# Patient Record
Sex: MALE | Race: WHITE | Employment: STUDENT | URBAN - METROPOLITAN AREA
[De-identification: names, ages, dates, MRNs, and addresses within clinical notes are randomized per-mention and may not be internally consistent; named-entity substitution may affect disease eponyms.]

---

## 2017-12-22 ENCOUNTER — OFFICE VISIT (OUTPATIENT)
Dept: FAMILY MEDICINE CLINIC | Facility: CLINIC | Age: 27
End: 2017-12-22

## 2017-12-22 VITALS
HEART RATE: 70 BPM | HEIGHT: 73 IN | TEMPERATURE: 98 F | DIASTOLIC BLOOD PRESSURE: 60 MMHG | OXYGEN SATURATION: 99 % | SYSTOLIC BLOOD PRESSURE: 112 MMHG | WEIGHT: 190 LBS | BODY MASS INDEX: 25.18 KG/M2

## 2017-12-22 DIAGNOSIS — L72.3 SEBACEOUS CYST: Primary | ICD-10-CM

## 2017-12-22 DIAGNOSIS — R22.9 MULTIPLE SKIN NODULES: ICD-10-CM

## 2017-12-22 PROCEDURE — 99212 OFFICE O/P EST SF 10 MIN: CPT | Performed by: FAMILY MEDICINE

## 2017-12-23 PROBLEM — R22.9 MULTIPLE SKIN NODULES: Status: ACTIVE | Noted: 2017-12-23

## 2017-12-23 PROBLEM — L72.3 SEBACEOUS CYST: Status: ACTIVE | Noted: 2017-12-23

## 2017-12-23 NOTE — PROGRESS NOTES
HPI:  Patient presents with:  Lump: in arms, abdomen and head for 6 years no pain or itchy       Fannie Buchanan is a 28year old male who primarily presents for Dermatology/Skin Exam    Patient's has concerns and complaints of:  Skin lesion(s)/superficia single 1 x 2 cm nodule with similar description as above, left abdominal wall, single 1 cm x 1 cm nodule with similar description as above  No other suspicious lesions and overall skin color wnl  HEENT: atraumatic, normocephalic  EYES:PERRLA, EOMI, conjunc